# Patient Record
Sex: FEMALE | Race: WHITE
[De-identification: names, ages, dates, MRNs, and addresses within clinical notes are randomized per-mention and may not be internally consistent; named-entity substitution may affect disease eponyms.]

---

## 2020-02-13 ENCOUNTER — HOSPITAL ENCOUNTER (EMERGENCY)
Dept: HOSPITAL 74 - FER | Age: 30
Discharge: HOME | End: 2020-02-13
Payer: COMMERCIAL

## 2020-02-13 VITALS — HEART RATE: 106 BPM | SYSTOLIC BLOOD PRESSURE: 147 MMHG | TEMPERATURE: 98.4 F | DIASTOLIC BLOOD PRESSURE: 85 MMHG

## 2020-02-13 VITALS — BODY MASS INDEX: 25.6 KG/M2

## 2020-02-13 DIAGNOSIS — O26.891: Primary | ICD-10-CM

## 2020-02-13 DIAGNOSIS — M35.00: ICD-10-CM

## 2020-02-13 DIAGNOSIS — Z3A.01: ICD-10-CM

## 2020-02-13 DIAGNOSIS — R07.89: ICD-10-CM

## 2020-02-13 DIAGNOSIS — D35.2: ICD-10-CM

## 2020-02-13 LAB
ALBUMIN SERPL-MCNC: 3.9 G/DL (ref 3.4–5)
ALP SERPL-CCNC: 49 U/L (ref 45–117)
ALT SERPL-CCNC: 16 U/L (ref 13–61)
ANION GAP SERPL CALC-SCNC: 8 MMOL/L (ref 8–16)
AST SERPL-CCNC: 27 U/L (ref 15–37)
BASOPHILS # BLD: 0.4 % (ref 0–2)
BILIRUB SERPL-MCNC: 0.4 MG/DL (ref 0.2–1)
BUN SERPL-MCNC: 11 MG/DL (ref 7–18)
CALCIUM SERPL-MCNC: 8.2 MG/DL (ref 8.5–10)
CHLORIDE SERPL-SCNC: 104 MMOL/L (ref 98–107)
CO2 SERPL-SCNC: 20 MMOL/L (ref 21–32)
CREAT SERPL-MCNC: 0.6 MG/DL (ref 0.55–1.3)
DEPRECATED RDW RBC AUTO: 11.5 % (ref 11.6–15.6)
EOSINOPHIL # BLD: 1.2 % (ref 0–4.5)
GLUCOSE SERPL-MCNC: 88 MG/DL (ref 74–106)
HCT VFR BLD CALC: 37 % (ref 32.4–45.2)
HGB BLD-MCNC: 12.6 GM/DL (ref 10.7–15.3)
LYMPHOCYTES # BLD: 20.5 % (ref 8–40)
MCH RBC QN AUTO: 33.5 PG (ref 25.7–33.7)
MCHC RBC AUTO-ENTMCNC: 33.9 G/DL (ref 32–36)
MCV RBC: 98.8 FL (ref 80–96)
MONOCYTES # BLD AUTO: 5.7 % (ref 3.8–10.2)
NEUTROPHILS # BLD: 72.2 % (ref 42.8–82.8)
PLATELET # BLD AUTO: 359 K/MM3 (ref 134–434)
PMV BLD: 8.5 FL (ref 7.5–11.1)
POTASSIUM SERPLBLD-SCNC: 3.2 MMOL/L (ref 3.5–5.1)
PROT SERPL-MCNC: 7.6 G/DL (ref 6.4–8.2)
RBC # BLD AUTO: 3.75 M/MM3 (ref 3.6–5.2)
SODIUM SERPL-SCNC: 132 MMOL/L (ref 136–145)
WBC # BLD AUTO: 7.7 K/MM3 (ref 4–10.8)

## 2020-02-13 NOTE — PDOC
Documentation entered by Eric Gutiérrez SCRIBE, acting as scribe for Tulio Cage MD.








Tulio Cage MD:  This documentation has been prepared by the Brock pisano Xhesika, SCRIBE, under my direction and personally reviewed by me in its 

entirety.  I confirm that the documentation accurately reflects all work, 

treatment, procedures, and medical decision making performed by me.  





History of Present Illness





- General


Chief Complaint: Chest Pain


Stated Complaint: CHEST PAIN


Time Seen by Provider: 02/13/20 20:25


History Source: Patient


Exam Limitations: No Limitations





- History of Present Illness


Initial Comments: 





02/13/20 20:32


The patient is a 29 year old female, currently 5 weeks pregnant,  with a 

significant PMH of sjogrens (not on any meds), pituitary tumor who presents to 

the emergency department for chest pain since 1pm. The patient states she was 

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 

that radiates to her R arm (resolved). Pt notse the pain seems worse when she 

is lyaing back. Pt states she had the stomach virus recently -diarrhea until 

last night, she had a few episodes of nbnb vomiting a few days ago but had 

since resolved.





The patient denies headache and dizziness. Denies fever, chills, cough, 

hemoptysis, leg swelling, abd pain, nausea, vomiting, diarrhea and 

constipation. Denies dysuria, frequency, urgency and hematuria.





Allergies: NKDA








Past History





- Past Medical History


Allergies/Adverse Reactions: 


 Allergies











Allergy/AdvReac Type Severity Reaction Status Date / Time


 


No Known Allergies Allergy   Verified 11/16/15 01:41











Home Medications: 


Ambulatory Orders





Cabergoline 0.5 mg PO UTDICT 06/07/12 


predniSONE [Deltasone -] 40 mg PO DAILY #4 tablet 11/16/15 











- Psycho Social/Smoking Cessation Hx


Smoking Status: No


Smoking History: Never smoked


Have you smoked in the past 12 months: No


Number of Cigarettes Smoked Daily: 0


Hx Alcohol Use: Yes (OCCAS.)


Drug/Substance Use Hx: No


Substance Use Type: None





**Review of Systems





- Review of Systems


Able to Perform ROS?: Yes


Comments:: 





02/13/20 20:32


GENERAL/CONSTITUTIONAL: No fever or chills. No weakness.


HEAD, EYES, EARS, NOSE AND THROAT: No change in vision. No ear pain or 

discharge. No sore throat.


CARDIOVASCULAR: + chest pain or shortness of breath.


RESPIRATORY: No cough, wheezing, or hemoptysis.


GASTROINTESTINAL: No nausea, vomiting, diarrhea or constipation.


GENITOURINARY: No dysuria, frequency, or change in urination.


MUSCULOSKELETAL: No joint or muscle swelling or pain. No neck or back pain.


SKIN: No rash


NEUROLOGIC: No headache, vertigo, loss of consciousness, or change in strength/

sensation.


ENDOCRINE: No increased thirst. No abnormal weight change.


HEMATOLOGIC/LYMPHATIC: No anemia, easy bleeding, or history of blood clots.


ALLERGIC/IMMUNOLOGIC: No hives or skin allergy.








*Physical Exam





- Vital Signs


 Last Vital Signs











Temp Pulse Resp BP Pulse Ox


 


 98.4 F   106 H  16   147/85   100 


 


 02/13/20 20:22  02/13/20 20:22  02/13/20 20:22  02/13/20 20:22  02/13/20 20:22














- Physical Exam





02/13/20 20:55


GENERAL: The patient is awake, alert, and fully oriented, Nontoxic - in no 

acute distress.


HEAD: Normocephalic, atraumatic.


EYES: extraocular movements intact, sclera anicteric, conjunctiva clear.


ENT: Normal voice,  Moist mucous membranes.


NECK: Normal range of motion, supple


LUNGS: Breath sounds equal, clear to auscultation bilaterally.  No wheezes, no 

rhonchi, no rales.


HEART: Regular rate and rhythm, normal S1 and S2 without murmur, rub or gallop.


ABDOMEN: Soft, nontender, No guarding, no rebound.  No CVA tenderness


EXTREMITIES: Normal range of motion, no edema.  Negative Homans, no calf 

tenderness


BACK: Mild reproducible tenderness medial to the scapula. 


NEUROLOGICAL: No facial assymetry, Normal speech, 


PSYCH: Normal mood, normal affect.


SKIN: Warm, Dry, normal turgor,








ED Treatment Course





- LABORATORY


CBC & Chemistry Diagram: 


 02/13/20 20:35





 02/13/20 20:35





- Medications


Given in the ED: 


ED Medications














Discontinued Medications














Generic Name Dose Route Start Last Admin





  Trade Name Freq  PRN Reason Stop Dose Admin


 


Acetaminophen  650 mg  02/13/20 20:27  02/13/20 20:31





  Tylenol -  PO  02/13/20 20:28  650 mg





  ONCE ONE   Administration





     





     





     





     














Medical Decision Making





- Medical Decision Making





02/13/20 20:55


29-year-old female 5 weeks gestation by dates presenting with a complaint of 

chest pain that is left-sided rating down her right arm That is pressure-like, 

without any associated shortness of breath, dyspnea residual, Hemoptysis, leg 

swelling, Fever, chills, cough.  





On exam patient is well-appearing, no distress. 





The patient's EKG was reviewed 


The patient's pulmonary exam is clear, no signs of diminished breath sounds 

worse sign suggestive of pneumothorax. 


Will obtain screening blood work we will give the patient Tylenol





There is No clinical signs or symptoms suggestive of PE or DVT. 


Patient's vitals is noted for mild tachycardia, Likely secondary to 

dehydration. 











A portion of this note was documented by scribe services under my direction. I 

have reviewed the details of the note, within reason, and agree with the 

documentation with the following case summary and management plan written by me


02/13/20 22:01


Patient feeling better after Tylenol. 


The patient's blood work was reviewed her carbon dioxide is slightly low likely 

secondary to her diarrhea. 


Patient's potassium was also slightly low will give her a dose of K-Dur. 


Have her follow-up with her primary care doctor for further management. 





Return precautions were discussed





I discussed the physical exam findings, ancillary test results and final 

diagnoses with the patient. I answered all of the patient's questions. The 

patient was satisfied with the care received and felt comfortable with the 

discharge plan and treatment plan. The patient will call their primary care 

physician within 24 hours to arrange follow-up and will return to the Emergency 

Department with any new, persistent or worsening symptoms. 





02/13/20 22:16


Repeat vital signs were normal


bp 118/75


pulse ox 100%


T 99


P 81








Discharge





- Discharge Information


Problems reviewed: Yes


Clinical Impression/Diagnosis: 


Chest pain


Qualifiers:


 Chest pain type: unspecified Qualified Code(s): R07.9 - Chest pain, unspecified





Condition: Improved


Disposition: HOME





- Admission


No





- Follow up/Referral


Referrals: 


Trang High MD [Primary Care Provider] - 





- Patient Discharge Instructions


Patient Printed Discharge Instructions:  DI for Atypical Chest Pain


Additional Instructions: 


Return to the emergency department immediately with ANY new, persistent or 

worsening symptoms Including any worsening of the chest pain shortness of breath

, leg swelling or pain, coughing up blood or any other concerns. 





You MUST call and follow up with your doctor within 3-4 days for further 

evaluation of your symptoms. Results were discussed with you. Please make sure 

your doctor reviews the results of your emergency evaluation. Your Emergency 

Department visit is not complete without a follow up with your doctor.








Print Language: ENGLISH





- Post Discharge Activity

## 2020-02-14 NOTE — EKG
Test Reason : 

Blood Pressure : ***/*** mmHG

Vent. Rate : 089 BPM     Atrial Rate : 089 BPM

   P-R Int : 158 ms          QRS Dur : 080 ms

    QT Int : 362 ms       P-R-T Axes : 070 076 037 degrees

   QTc Int : 440 ms

 

NORMAL SINUS RHYTHM

POSSIBLE LEFT ATRIAL ENLARGEMENT

NONSPECIFIC ST AND T WAVE ABNORMALITY

ABNORMAL ECG

NO PREVIOUS ECGS AVAILABLE

Confirmed by MYRON SHANNON MD ([id number]) on 2/14/2020 12:13:57 PM

 

Referred By: DR MELGAR           Confirmed By:MYRON SHANNON MD